# Patient Record
Sex: MALE | Race: BLACK OR AFRICAN AMERICAN
[De-identification: names, ages, dates, MRNs, and addresses within clinical notes are randomized per-mention and may not be internally consistent; named-entity substitution may affect disease eponyms.]

---

## 2018-09-17 ENCOUNTER — HOSPITAL ENCOUNTER (EMERGENCY)
Dept: HOSPITAL 97 - ER | Age: 33
Discharge: HOME | End: 2018-09-17
Payer: SELF-PAY

## 2018-09-17 DIAGNOSIS — I16.0: Primary | ICD-10-CM

## 2018-09-17 DIAGNOSIS — E78.5: ICD-10-CM

## 2018-09-17 PROCEDURE — 99281 EMR DPT VST MAYX REQ PHY/QHP: CPT

## 2018-09-17 NOTE — ER
Nurse's Notes                                                                                     

 [facility]                                                                

Name: Alta Coffey                                                                            

Age: 33 yrs                                                                                       

Sex: Male                                                                                         

: 1985                                                                                   

MRN: [id number]                                                                                   

Arrival Date: 2018                                                                          

Time: 19:02                                                                                       

Account#: [id number]                                                                            

Bed Waiting                                                                                       

Private MD: None, None                                                                            

Diagnosis: HYPERTENSIVE URGENCY                                                                   

                                                                                                  

Presentation:                                                                                     

                                                                                             

19:35 Presenting complaint: Patient states: Patient was at work and had physical, BP was      lp1 

      194/110, HR 79; Denies any chest pain, shortness of breath; Patient states he has           

      missed some doses due to being out of town, about 1 week. Transition of care: patient       

      was not received from another setting of care. Onset of symptoms was 2018. Risk Assessment: Do you want to hurt yourself or someone else? Patient reports no     

      desire to harm self or others. Initial Sepsis Screen: Does the patient meet any 2           

      criteria? No. Patient's initial sepsis screen is negative. Does the patient have a          

      suspected source of infection? No. Patient's initial sepsis screen is negative. Care        

      prior to arrival: None.                                                                     

19:35 Method Of Arrival: Ambulatory                                                           lp1 

19:35 Acuity: ANU 3                                                                           lp1 

                                                                                                  

Triage Assessment:                                                                                

19:42 General: Appears in no apparent distress. Behavior is calm, cooperative, appropriate    lp1 

      for age. Pain: Denies pain. Cardiovascular: Patient's skin is warm and dry.                 

                                                                                                  

Historical:                                                                                       

- Allergies:                                                                                      

19:39 No Known Allergies;                                                                     lp1 

- Home Meds:                                                                                      

19:39 amlodipine oral [Active]; carvedilol 12.5 mg oral tab daily [Active]; Prednisone Oral   lp1 

      [Active];                                                                                   

19:41 hydrochlorothiazide Oral [Active];                                                      lp1 

- PMHx:                                                                                           

19:39 Hypertension; Hyperlipidemia;                                                           lp1 

- PSHx:                                                                                           

19:39 None;                                                                                   lp1 

                                                                                                  

- Immunization history:: Adult Immunizations up to date.                                          

- Social history:: Smoking status: Patient uses tobacco products, smokes one pack                 

  cigarettes per day.                                                                             

- Ebola Screening: : No symptoms or risks identified at this time.                                

                                                                                                  

                                                                                                  

Screenin:42 Abuse screen: Denies threats or abuse. Denies injuries from another. Nutritional        lp1 

      screening: No deficits noted. Tuberculosis screening: No symptoms or risk factors           

      identified. Fall Risk None identified.                                                      

                                                                                                  

Assessment:                                                                                       

19:42 General: Appears in no apparent distress. Pain: Denies pain. Neuro: Level of            lp1 

      Consciousness is awake, alert, obeys commands. Cardiovascular: Patient's skin is warm       

      and dry. Respiratory: No deficits noted. GI: No deficits noted. : No deficits noted.      

      EENT: No deficits noted. Derm: Skin is intact, Skin is dry, Skin is normal.                 

      Musculoskeletal: No deficits noted.                                                         

19:43 Reassessment: Dr. Martinez assessing patient during triage.                               lp1 

                                                                                                  

Vital Signs:                                                                                      

19:39  / 77 RA; Pulse 102; Resp 16; Temp 97(TE); Pulse Ox 98% on R/A; Weight 94.8 kg;   lp1 

      Height 5 ft. 9 in. (175.26 cm); Pain 0/10;                                                  

19:39  / 73 LA;                                                                         lp1 

19:39 Body Mass Index 30.86 (94.80 kg, 175.26 cm)                                             lp1 

                                                                                                  

ED Course:                                                                                        

19:02 Patient arrived in ED.                                                                  sb2 

19:02 None, None is Private Physician.                                                        sb2 

19:35 Juni Martinez MD is Attending Physician.                                            lp1 

19:37 Triage completed.                                                                       lp1 

19:39 Arm band placed on right wrist.                                                         lp1 

19:42 Patient has correct armband on for positive identification.                             lp1 

19:42 No provider procedures requiring assistance completed. Patient did not have IV access   lp1 

      during this emergency room visit.                                                           

20:04 Lashawn Parham, RN is Primary Nurse.                                                       lp1 

                                                                                                  

Administered Medications:                                                                         

No medications were administered                                                                  

                                                                                                  

                                                                                                  

Outcome:                                                                                          

19:43 Condition: good                                                                         lp1 

19:48 Discharge ordered by MD.                                                                tw4 

20:04 Discharged to home ambulatory.                                                          lp1 

20:04 Discharge instructions given to patient, Instructed on discharge instructions, follow       

      up and referral plans. Demonstrated understanding of instructions, follow-up care.          

20:04 Patient left the ED.                                                                    lp1 

                                                                                                  

Signatures:                                                                                       

Lashawn Parham, ROLAND                         RN   lp1                                                  

Juni Martinez MD MD   tw4                                                  

Marina Andrade                               sb2                                                  

                                                                                                  

Corrections: (The following items were deleted from the chart)                                    

19:42 19:39 Pulse Ox 98% RA; Temp 97F Temporal; 94.8 kg; Height 5 ft. 9 in.; BMI: 30.8; Pain  lp1 

      0/10; lp1                                                                                   

                                                                                                  

**************************************************************************************************

## 2018-09-18 NOTE — EDPHYS
Physician Documentation                                                                           

 [facility]                                                                

Name: Alta Citizen                                                                            

Age: 33 yrs                                                                                       

Sex: Male                                                                                         

: 1985                                                                                   

MRN: [id number]                                                                                   

Arrival Date: 2018                                                                          

Time: 19:02                                                                                       

Account#: [id number]                                                                            

Bed Waiting                                                                                       

Private MD: None, None                                                                            

ED Physician Juni Martinez                                                                     

HPI:                                                                                              

                                                                                             

02:18 This 33 yrs old Black Male presents to ER via Ambulatory with complaints of High Blood  tw4 

      Pressure.                                                                                   

02:18 The patient has elevated blood pressure and discovered this at home. Onset: The         tw4 

      symptoms/episode began/occurred today. Modifying factors: The symptoms are aggravated       

      by discontinuation of meds, ACE-inhibitor, calcium channel blocker,                         

      hydrochlorothiazide. Associated signs and symptoms: The patient has no apparent             

      associated signs or symptoms. Severity of symptoms: At its worst the blood pressure was     

      moderate. The patient has not experienced similar symptoms in the past.                     

                                                                                                  

Historical:                                                                                       

- Allergies:                                                                                      

                                                                                             

19:39 No Known Allergies;                                                                     lp1 

- Home Meds:                                                                                      

19:39 amlodipine oral [Active]; carvedilol 12.5 mg oral tab daily [Active]; Prednisone Oral   lp1 

      [Active];                                                                                   

19:41 hydrochlorothiazide Oral [Active];                                                      lp1 

- PMHx:                                                                                           

19:39 Hypertension; Hyperlipidemia;                                                           lp1 

- PSHx:                                                                                           

19:39 None;                                                                                   lp1 

                                                                                                  

- Immunization history:: Adult Immunizations up to date.                                          

- Social history:: Smoking status: Patient uses tobacco products, smokes one pack                 

  cigarettes per day.                                                                             

- Ebola Screening: : No symptoms or risks identified at this time.                                

                                                                                                  

                                                                                                  

ROS:                                                                                              

                                                                                             

02:18 Constitutional: Negative for fever, chills, and weight loss, Cardiovascular: Negative   tw4 

      for chest pain, palpitations, and edema, Respiratory: Negative for shortness of breath,     

      cough, wheezing, and pleuritic chest pain, Abdomen/GI: Negative for abdominal pain,         

      nausea, vomiting, diarrhea, and constipation.                                               

                                                                                                  

Exam:                                                                                             

02:18 Constitutional:  This is a well developed, well nourished patient who is awake, alert,  tw4 

      and in no acute distress. Chest/axilla:  Normal chest wall appearance and motion.           

      Nontender with no deformity.  No lesions are appreciated. Cardiovascular:  Regular rate     

      and rhythm with a normal S1 and S2.  No gallops, murmurs, or rubs.  Normal PMI, no JVD.     

       No pulse deficits. Respiratory:  Lungs have equal breath sounds bilaterally, clear to      

      auscultation and percussion.  No rales, rhonchi or wheezes noted.  No increased work of     

      breathing, no retractions or nasal flaring. MS/ Extremity:  Pulses equal, no cyanosis.      

      Neurovascular intact.  Full, normal range of motion. Neuro:  Awake and alert, GCS 15,       

      oriented to person, place, time, and situation.  Cranial nerves II-XII grossly intact.      

      Motor strength 5/5 in all extremities.  Sensory grossly intact.  Cerebellar exam            

      normal.  Normal gait.                                                                       

                                                                                                  

Vital Signs:                                                                                      

                                                                                             

19:39  / 77 RA; Pulse 102; Resp 16; Temp 97(TE); Pulse Ox 98% on R/A; Weight 94.8 kg;   lp1 

      Height 5 ft. 9 in. (175.26 cm); Pain 0/10;                                                  

19:39  / 73 LA;                                                                         lp1 

19:39 Body Mass Index 30.86 (94.80 kg, 175.26 cm)                                             1 

                                                                                                  

MDM:                                                                                              

19:48 Patient medically screened.                                                             tw4 

                                                                                             

02:18 Data reviewed: vital signs, nurses notes. Counseling: I had a detailed discussion with  [facility] 

      the patient and/or guardian regarding: the historical points, exam findings, and any        

      diagnostic results supporting the discharge/admit diagnosis. Special discussion: I          

      discussed with the patient/guardian in detail that at this point there is no indication     

      for admission to the hospital. It is understood, however, that if the symptoms persist      

      or worsen the patient needs to return immediately for re-evaluation.                        

                                                                                                  

Administered Medications:                                                                         

No medications were administered                                                                  

                                                                                                  

                                                                                                  

Disposition:                                                                                      

02:38 Chart complete.                                                                         [facility] 

                                                                                                  

Disposition:                                                                                      

18 19:48 Discharged to Home. Impression: HYPERTENSIVE URGENCY.                              

- Condition is Stable.                                                                            

- Discharge Instructions: Hypertension, Managing Your Hypertension.                               

                                                                                                  

- Work release form, Medication Reconciliation Form, Thank You Letter, Antibiotic                 

  Education, Prescription Opioid Use form.                                                        

- Follow up: Private Physician; When: Upon discharge from the Emergency Department;               

  Reason: Recheck today's complaints, Re-evaluation by your physician.                            

- Problem is new.                                                                                 

- Symptoms have improved.                                                                         

                                                                                                  

                                                                                                  

                                                                                                  

Signatures:                                                                                       

Lashawn Parham RN                         RN   lp1                                                  

Juni Martinez MD MD   4                                                  

                                                                                                  

Corrections: (The following items were deleted from the chart)                                    

                                                                                             

20:04 19:48 2018 19:48 Discharged to Home. Impression: HYPERTENSIVE URGENCY. Condition  lp1 

      is Stable. Discharge Instructions: Hypertension, Managing Your Hypertension. Forms are      

      Work release form, Thank You Letter, Medication Reconciliation Form, Antibiotic             

      Education, Prescription Opioid Use. Follow up: Private Physician; When: Upon discharge      

      from the Emergency Department; Reason: Recheck today's complaints, Re-evaluation by         

      your physician. Problem is new. Symptoms have improved. tw4                                 

                                                                                                  

**************************************************************************************************

## 2019-09-01 ENCOUNTER — HOSPITAL ENCOUNTER (EMERGENCY)
Dept: HOSPITAL 97 - ER | Age: 34
Discharge: HOME | End: 2019-09-01
Payer: SELF-PAY

## 2019-09-01 DIAGNOSIS — E78.5: ICD-10-CM

## 2019-09-01 DIAGNOSIS — H60.92: Primary | ICD-10-CM

## 2019-09-01 DIAGNOSIS — I10: ICD-10-CM

## 2019-09-01 PROCEDURE — 99282 EMERGENCY DEPT VISIT SF MDM: CPT

## 2019-09-01 NOTE — EDPHYS
Physician Documentation                                                                           

 [facility]                                                                 

Name: Alta Citiwally                                                                            

Age: 34 yrs                                                                                       

Sex: Male                                                                                         

: 1985                                                                                   

MRN: [id number]                                                                                   

Arrival Date: 2019                                                                          

Time: 10:34                                                                                       

Account#: [id number]                                                                            

Bed Waiting                                                                                       

Private MD:                                                                                       

Joseluis Parker                                                                      

HPI:                                                                                              

                                                                                             

11:00 This 34 yrs old Black Male presents to ER via Ambulatory with complaints of Foreign     kb  

      Body In Ear.                                                                                

11:00 The patient presents with a foreign body sensation, a fullness, pain. The complaints    kb  

      affect the left ear. Onset: The symptoms/episode began/occurred 2 day(s) ago. Modifying     

      factors: The symptoms are alleviated by nothing, the symptoms are aggravated by             

      nothing. Associated signs and symptoms: The patient has no apparent associated signs or     

      symptoms. Severity of symptoms: At their worst the symptoms were moderate in the            

      emergency department the symptoms are unchanged. The patient has not experienced            

      similar symptoms in the past. The patient has not recently seen a physician.                

                                                                                                  

Historical:                                                                                       

- Allergies:                                                                                      

10:37 No Known Allergies;                                                                     la1 

- Home Meds:                                                                                      

10:37 amlodipine oral [Active]; carvedilol 12.5 mg Oral tab daily [Active];                   la1 

      Hydrochlorothiazide Oral [Active]; Prednisone Oral [Active];                                

- PMHx:                                                                                           

10:37 Hyperlipidemia; Hypertension;                                                           la1 

                                                                                                  

- Immunization history:: Adult Immunizations up to date.                                          

- Social history:: Smoking status: Patient/guardian denies using tobacco, Smoking                 

  status: Patient uses tobacco products, smokes one pack cigarettes per day.                      

- Ebola Screening: : No symptoms or risks identified at this time.                                

                                                                                                  

                                                                                                  

ROS:                                                                                              

10:47 Constitutional: Negative for fever, chills, and weight loss, Eyes: Negative for injury, kb  

      pain, redness, and discharge, Neck: Negative for injury, pain, and swelling,                

      Cardiovascular: Negative for chest pain, palpitations, and edema, Respiratory: Negative     

      for shortness of breath, cough, wheezing, and pleuritic chest pain, Abdomen/GI:             

      Negative for abdominal pain, nausea, vomiting, diarrhea, and constipation,                  

      MS/Extremity: Negative for injury and deformity, Skin: Negative for injury, rash, and       

      discoloration, Neuro: Negative for headache, weakness, numbness, tingling, and seizure.     

10:47 ENT: Positive for drainage from ear(s), ear pain.                                           

                                                                                                  

Exam:                                                                                             

10:47 Constitutional:  This is a well developed, well nourished patient who is awake, alert,  kb  

      and in no acute distress. Head/Face:  Normocephalic, atraumatic. Neck:  Trachea             

      midline, no thyromegaly or masses palpated, and no cervical lymphadenopathy.  Supple,       

      full range of motion without nuchal rigidity, or vertebral point tenderness.  No            

      Meningismus. Chest/axilla:  Normal chest wall appearance and motion.  Nontender with no     

      deformity.  No lesions are appreciated. Cardiovascular:  Regular rate and rhythm with a     

      normal S1 and S2.  No gallops, murmurs, or rubs.  Normal PMI, no JVD.  No pulse             

      deficits. Respiratory:  Lungs have equal breath sounds bilaterally, clear to                

      auscultation and percussion.  No rales, rhonchi or wheezes noted.  No increased work of     

      breathing, no retractions or nasal flaring. Abdomen/GI:  Soft, non-tender, with normal      

      bowel sounds.  No distension or tympany.  No guarding or rebound.  No evidence of           

      tenderness throughout. Skin:  Warm, dry with normal turgor.  Normal color with no           

      rashes, no lesions, and no evidence of cellulitis. MS/ Extremity:  Pulses equal, no         

      cyanosis.  Neurovascular intact.  Full, normal range of motion. Neuro:  Awake and           

      alert, GCS 15, oriented to person, place, time, and situation.  Cranial nerves II-XII       

      grossly intact.  Motor strength 5/5 in all extremities.  Sensory grossly intact.            

      Cerebellar exam normal.  Normal gait.                                                       

10:47 ENT: External ear(s): are unremarkable, Ear canal(s): erythema, that is moderate, of        

      the left canal, swelling, that is moderate, of the left canal, TM's: are normal, Nose:      

      is normal.                                                                                  

                                                                                                  

Vital Signs:                                                                                      

10:37  / 110; Pulse 93; Resp 16; Temp 97.1; Pulse Ox 100% on R/A; Weight 97.98 kg;      la1 

      Height 5 ft. 9 in. (175.26 cm);                                                             

10:37 Body Mass Index 31.90 (97.98 kg, 175.26 cm)                                             la1 

                                                                                                  

MDM:                                                                                              

10:41 Patient medically screened.                                                             kb  

10:46 Data reviewed: vital signs, nurses notes. Data interpreted: Pulse oximetry: on room air kb  

      is 100 %. Interpretation: normal. Counseling: I had a detailed discussion with the          

      patient and/or guardian regarding: the historical points, exam findings, and any            

      diagnostic results supporting the discharge/admit diagnosis, the need for outpatient        

      follow up, an ENT specialist, to return to the emergency department if symptoms worsen      

      or persist or if there are any questions or concerns that arise at home.                    

                                                                                                  

Administered Medications:                                                                         

No medications were administered                                                                  

                                                                                                  

                                                                                                  

Disposition:                                                                                      

                                                                                             

09:17 Co-signature as Attending Physician, Joseluis Camara MD I agree with the assessment and  ирина 

      plan of care.                                                                               

                                                                                                  

Disposition:                                                                                      

19 10:45 Discharged to Home. Impression: Unspecified otitis externa, left ear.              

- Condition is Stable.                                                                            

- Discharge Instructions: Otitis Externa, Easy-to-Read, Ear Drops, Adult, Easy-to-Read.           

- Prescriptions for Cortisporin 3.5- 10,000-1 mg/mL-unit/mL-% Otic solution - instill 4           

  drop by OTIC route 3 times per day for 7 days; 1 bottle.                                        

- Medication Reconciliation Form, Thank You Letter, Antibiotic Education, Prescription            

  Opioid Use form.                                                                                

- Follow up: Emergency Department; When: As needed; Reason: Worsening of condition.               

  Follow up: Private Physician; When: 2 - 3 days; Reason: Recheck today's complaints,             

  Continuance of care, Re-evaluation by your physician.                                           

                                                                                                  

                                                                                                  

                                                                                                  

Signatures:                                                                                       

Gerda Cross, EDMUNDP-C                 EDMUNDP-Marcosb                                                   

Joseluis Camara MD MD cha Attema, Lee RN                         RN   la1                                                  

                                                                                                  

Corrections: (The following items were deleted from the chart)                                    

                                                                                             

10:48 10:45 2019 10:45 Discharged to Home. Impression: Unspecified otitis externa, left la1 

      ear. Condition is Stable. Forms are Medication Reconciliation Form, Thank You Letter,       

      Antibiotic Education, Prescription Opioid Use. Follow up: Emergency Department; When:       

      As needed; Reason: Worsening of condition. Follow up: Private Physician; When: 2 - 3        

      days; Reason: Recheck today's complaints, Continuance of care, Re-evaluation by your        

      physician. kb                                                                               

                                                                                                  

**************************************************************************************************

## 2019-09-01 NOTE — ER
Nurse's Notes                                                                                     

 [facility]                                                                 

Name: Alta Coffey                                                                            

Age: 34 yrs                                                                                       

Sex: Male                                                                                         

: 1985                                                                                   

MRN: [id number]                                                                                   

Arrival Date: 2019                                                                          

Time: 10:34                                                                                       

Account#: [id number]                                                                            

Bed Waiting                                                                                       

Private MD:                                                                                       

Diagnosis: Unspecified otitis externa, left ear                                                   

                                                                                                  

Presentation:                                                                                     

                                                                                             

10:36 Presenting complaint: Patient states: I think there is a bug in my left ear, has been   la1 

      bothering me for the last two days. Transition of care: patient was not received from       

      another setting of care. Onset of symptoms was 2019. Risk Assessment: Do      

      you want to hurt yourself or someone else? Patient reports no desire to harm self or        

      others. Initial Sepsis Screen: Does the patient meet any 2 criteria? No. Patient's          

      initial sepsis screen is negative. Does the patient have a suspected source of              

      infection? No. Patient's initial sepsis screen is negative. Care prior to arrival: None.    

10:36 Method Of Arrival: Ambulatory                                                           la1 

10:36 Acuity: ANU 4                                                                           la1 

                                                                                                  

Historical:                                                                                       

- Allergies:                                                                                      

10:37 No Known Allergies;                                                                     la1 

- Home Meds:                                                                                      

10:37 amlodipine oral [Active]; carvedilol 12.5 mg Oral tab daily [Active];                   la1 

      Hydrochlorothiazide Oral [Active]; Prednisone Oral [Active];                                

- PMHx:                                                                                           

10:37 Hyperlipidemia; Hypertension;                                                           la1 

                                                                                                  

- Immunization history:: Adult Immunizations up to date.                                          

- Social history:: Smoking status: Patient/guardian denies using tobacco, Smoking                 

  status: Patient uses tobacco products, smokes one pack cigarettes per day.                      

- Ebola Screening: : No symptoms or risks identified at this time.                                

                                                                                                  

                                                                                                  

Screening:                                                                                        

10:42 Abuse screen: Denies threats or abuse. Nutritional screening: No deficits noted.        la1 

      Tuberculosis screening: No symptoms or risk factors identified. Fall Risk None              

      identified.                                                                                 

                                                                                                  

Assessment:                                                                                       

10:41 General: Appears in no apparent distress. Behavior is calm, cooperative. Pain:          la1 

      Complains of pain in left ear. Neuro: Level of Consciousness is awake, alert, obeys         

      commands, Oriented to person, place, time, situation. Cardiovascular: Capillary refill      

      < 3 seconds Patient's skin is warm and dry. Respiratory: Airway is patent Respiratory       

      effort is even, unlabored. GI: No signs and/or symptoms were reported involving the         

      gastrointestinal system. : No signs and/or symptoms were reported regarding the           

      genitourinary system. EENT: Ear canal swollen.                                              

                                                                                                  

Vital Signs:                                                                                      

10:37  / 110; Pulse 93; Resp 16; Temp 97.1; Pulse Ox 100% on R/A; Weight 97.98 kg;      la1 

      Height 5 ft. 9 in. (175.26 cm);                                                             

10:37 Body Mass Index 31.90 (97.98 kg, 175.26 cm)                                             la1 

                                                                                                  

ED Course:                                                                                        

10:34 Patient arrived in ED.                                                                  mr  

10:36 Triage completed.                                                                       la1 

10:38 Arm band placed on right wrist.                                                         la1 

10:40 Gerda Cross FNP-C is PHCP.                                                        kb  

10:41 Joseluis Camara MD is Attending Physician.                                             kb  

10:42 Patient has correct armband on for positive identification.                             la1 

10:42 No provider procedures requiring assistance completed. Patient did not have IV access   la1 

      during this emergency room visit.                                                           

                                                                                                  

Administered Medications:                                                                         

No medications were administered                                                                  

                                                                                                  

                                                                                                  

Outcome:                                                                                          

10:45 Discharge ordered by MD.                                                                kb  

10:46 Discharged to home ambulatory.                                                          la1 

10:46 Condition: stable                                                                           

10:46 Discharge instructions given to patient, Instructed on discharge instructions, follow       

      up and referral plans. medication usage, Demonstrated understanding of instructions,        

      follow-up care, medications, Prescriptions given X 1.                                       

10:48 Patient left the ED.                                                                    la1 

                                                                                                  

Signatures:                                                                                       

Gerda Cross FNP-C FNP-Gucci                                                   

LaneSonia                                 mr BabcockRg, RN                         RN   la1                                                  

                                                                                                  

**************************************************************************************************